# Patient Record
Sex: MALE | Race: BLACK OR AFRICAN AMERICAN | NOT HISPANIC OR LATINO | Employment: FULL TIME | URBAN - METROPOLITAN AREA
[De-identification: names, ages, dates, MRNs, and addresses within clinical notes are randomized per-mention and may not be internally consistent; named-entity substitution may affect disease eponyms.]

---

## 2017-11-20 ENCOUNTER — HOSPITAL ENCOUNTER (EMERGENCY)
Facility: HOSPITAL | Age: 27
Discharge: HOME | End: 2017-11-20
Attending: EMERGENCY MEDICINE | Admitting: EMERGENCY MEDICINE

## 2017-11-20 ENCOUNTER — APPOINTMENT (OUTPATIENT)
Dept: GENERAL RADIOLOGY | Facility: HOSPITAL | Age: 27
End: 2017-11-20

## 2017-11-20 VITALS
DIASTOLIC BLOOD PRESSURE: 86 MMHG | SYSTOLIC BLOOD PRESSURE: 130 MMHG | TEMPERATURE: 98.1 F | HEART RATE: 88 BPM | WEIGHT: 220 LBS | OXYGEN SATURATION: 99 % | BODY MASS INDEX: 32.58 KG/M2 | RESPIRATION RATE: 16 BRPM | HEIGHT: 69 IN

## 2017-11-20 DIAGNOSIS — J40 BRONCHITIS: ICD-10-CM

## 2017-11-20 DIAGNOSIS — J06.9 UPPER RESPIRATORY TRACT INFECTION, UNSPECIFIED TYPE: Primary | ICD-10-CM

## 2017-11-20 DIAGNOSIS — J01.90 ACUTE SINUSITIS, RECURRENCE NOT SPECIFIED, UNSPECIFIED LOCATION: ICD-10-CM

## 2017-11-20 PROCEDURE — 71020 HC CHEST PA AND LATERAL: CPT

## 2017-11-20 PROCEDURE — 99283 EMERGENCY DEPT VISIT LOW MDM: CPT

## 2017-11-20 RX ORDER — CETIRIZINE HYDROCHLORIDE 10 MG/1
10 TABLET ORAL DAILY
Qty: 10 TABLET | Refills: 0 | Status: SHIPPED | OUTPATIENT
Start: 2017-11-20

## 2017-11-20 RX ORDER — DEXTROMETHORPHAN HYDROBROMIDE AND PROMETHAZINE HYDROCHLORIDE 15; 6.25 MG/5ML; MG/5ML
5 SYRUP ORAL 3 TIMES DAILY PRN
Qty: 60 ML | Refills: 0 | Status: SHIPPED | OUTPATIENT
Start: 2017-11-20

## 2017-11-20 RX ORDER — PREDNISONE 20 MG/1
60 TABLET ORAL DAILY
Qty: 12 TABLET | Refills: 0 | Status: SHIPPED | OUTPATIENT
Start: 2017-11-20

## 2017-11-20 NOTE — DISCHARGE INSTRUCTIONS
Follow up with one of the [facility] physician groups below to setup primary care. If you have trouble following up, please call Pratima Mary, our transitional care nurse, at [phone].    (Dr. Trujillo, Dr. Ayala, Dr. Ness, and Dr. Croft.)  [facility], Primary Care, [phone], [address], [place] [address]    [facility], Primary Care, [phone], [address], [address] [place], [address] [facility]) [facility], Primary Care, [phone], [address] [place], [address] [facility], Primary Care, [phone], [address] [place], [address]     [place] 1 [facility], Primary Care, [phone], [address], [address] [place], [address]    [place] 2 [facility], Primary Care, [phone], [address]    [place], [address] [facility], Primary Care, [phone], [address], [address] [place], [address] [facility], Primary Care, [phone], [address] [place], [address] [facility]) [facility], Primary Care, [phone], [address] [place], [address] [facility], Primary Care, [phone], [address], [address] [place], [address] [facility], Primary Care, [phone], [address], [place], [address]     [facility], Primary Care, [phone], [address] [address]    Follow up with one of the physician centers below to setup primary care.    [facility], [phone],  [address], [address], [place], [facility]    [facility], [phone], [address] [facility] [address] [facility], [address] [place] [address], [phone], [address] [place], [facility];     [facility], [phone], [address] [place] [address]

## 2017-11-20 NOTE — ED PROVIDER NOTES
Subjective   Patient is a 27 y.o. male presenting with URI.   URI   Presenting symptoms: congestion, cough, ear pain and sore throat    Presenting symptoms: no fever    Severity:  Moderate  Onset quality:  Gradual  Duration:  3 days  Timing:  Constant  Progression:  Worsening  Chronicity:  New  Relieved by:  Nothing  Worsened by:  Nothing  Ineffective treatments:  OTC medications  Associated symptoms: sinus pain and sneezing    Associated symptoms: no headaches, no neck pain and no wheezing    Risk factors: sick contacts        Review of Systems   Constitutional: Negative for fever.   HENT: Positive for congestion, ear pain, sinus pain, sneezing and sore throat.    Respiratory: Positive for cough. Negative for wheezing.    Musculoskeletal: Negative for neck pain.   Neurological: Negative for headaches.   All other systems reviewed and are negative.      Past Medical History:   Diagnosis Date   • H/O seasonal allergies        Allergies   Allergen Reactions   • Sulfa Antibiotics        History reviewed. No pertinent surgical history.    History reviewed. No pertinent family history.    Social History     Social History   • Marital status: Single     Spouse name: N/A   • Number of children: N/A   • Years of education: N/A     Social History Main Topics   • Smoking status: Current Some Day Smoker   • Smokeless tobacco: None      Comment: when drinking   • Alcohol use Yes      Comment: socially   • Drug use: No   • Sexual activity: Not Asked     Other Topics Concern   • None     Social History Narrative   • None           Objective   Physical Exam   Constitutional: He is oriented to person, place, and time. He appears well-developed and well-nourished.   HENT:   Head: Normocephalic and atraumatic.   Right Ear: External ear normal. Tympanic membrane is bulging.   Left Ear: External ear normal. Tympanic membrane is bulging.   Nose: Right sinus exhibits maxillary sinus tenderness and frontal sinus tenderness. Left sinus  exhibits maxillary sinus tenderness and frontal sinus tenderness.   Mouth/Throat: Oropharynx is clear and moist. No tonsillar abscesses. No tonsillar exudate.   Eyes: Conjunctivae and EOM are normal. Pupils are equal, round, and reactive to light.   Neck: Normal range of motion. Neck supple.   Cardiovascular: Normal rate, regular rhythm, normal heart sounds and intact distal pulses.    Pulmonary/Chest: Effort normal and breath sounds normal.   Abdominal: Soft. Bowel sounds are normal.   Musculoskeletal: Normal range of motion.   Neurological: He is alert and oriented to person, place, and time.   Skin: Skin is warm and dry.   Psychiatric: He has a normal mood and affect. His behavior is normal. Judgment normal.       Procedures         ED Course  ED Course   Comment By Time   Well aware of the ss of worsening condition. All thankful and agreeable. ALINE Phan 11/20 1701                  [facility]    Final diagnoses:   Upper respiratory tract infection, unspecified type   Acute sinusitis, recurrence not specified, unspecified location   Bronchitis            ALINE Phan  11/20/17 1975       ALINE Phan  11/20/17 8384